# Patient Record
Sex: FEMALE | URBAN - METROPOLITAN AREA
[De-identification: names, ages, dates, MRNs, and addresses within clinical notes are randomized per-mention and may not be internally consistent; named-entity substitution may affect disease eponyms.]

---

## 2024-07-03 ENCOUNTER — APPOINTMENT (OUTPATIENT)
Dept: URBAN - METROPOLITAN AREA CLINIC 205 | Age: 24
Setting detail: DERMATOLOGY
End: 2024-07-03

## 2024-07-03 DIAGNOSIS — L21.8 OTHER SEBORRHEIC DERMATITIS: ICD-10-CM

## 2024-07-03 DIAGNOSIS — L65.9 NONSCARRING HAIR LOSS, UNSPECIFIED: ICD-10-CM

## 2024-07-03 PROCEDURE — OTHER PRESCRIPTION: OTHER

## 2024-07-03 PROCEDURE — 99203 OFFICE O/P NEW LOW 30 MIN: CPT

## 2024-07-03 PROCEDURE — OTHER TREATMENT REGIMEN: OTHER

## 2024-07-03 PROCEDURE — OTHER COUNSELING: OTHER

## 2024-07-03 RX ORDER — KETOCONAZOLE 20 MG/ML
SHAMPOO, SUSPENSION TOPICAL BIW
Qty: 120 | Refills: 5 | Status: ERX | COMMUNITY
Start: 2024-07-03

## 2024-07-03 NOTE — HPI: HAIR LOSS
Additional History: Hair loss started after she stoped  taking her birth control. Patient thinks the reason she is loosing her hair is because she rapidly stopped taking her birth control.

## 2024-07-03 NOTE — PROCEDURE: TREATMENT REGIMEN
Plan: Reassess once pt has completed three months of ocp. Pt reports hair was fine, suddenly stopped ocp and shedding increased, then went back on ocp recently.
Detail Level: Zone
Initiate Treatment: Ketoconazole 2% shAmpoo qod, lather, let sit x 3-5 min, rinse well
Plan: Blow dry well